# Patient Record
Sex: FEMALE | Race: BLACK OR AFRICAN AMERICAN | NOT HISPANIC OR LATINO
[De-identification: names, ages, dates, MRNs, and addresses within clinical notes are randomized per-mention and may not be internally consistent; named-entity substitution may affect disease eponyms.]

---

## 2021-08-12 ENCOUNTER — DASHBOARD ENCOUNTERS (OUTPATIENT)
Age: 26
End: 2021-08-12

## 2021-08-12 ENCOUNTER — OFFICE VISIT (OUTPATIENT)
Dept: URBAN - METROPOLITAN AREA CLINIC 2 | Facility: CLINIC | Age: 26
End: 2021-08-12
Payer: COMMERCIAL

## 2021-08-12 DIAGNOSIS — K59.04 CHRONIC IDIOPATHIC CONSTIPATION: ICD-10-CM

## 2021-08-12 DIAGNOSIS — Z3A.14 14 WEEKS GESTATION OF PREGNANCY: ICD-10-CM

## 2021-08-12 PROBLEM — 82934008: Status: ACTIVE | Noted: 2021-08-12

## 2021-08-12 PROCEDURE — 99204 OFFICE O/P NEW MOD 45 MIN: CPT | Performed by: INTERNAL MEDICINE

## 2021-08-12 NOTE — HPI-TODAY'S VISIT:
Very pleasant 25-year-old female that is about 13 to 14 weeks pregnant is seen today for complaints of constipation.  She reports long history of constipation that she has been able to manage with diet.  When she was younger her mother did take her to the ER several times for disimpaction.  She has seen OB/GYN and was told to increase fiber and water.  She is taking MiraLAX twice daily.  She reports no bowel movement in almost a month now.  About 2 weeks ago she did manually disimpact a small amount.  She has had colonoscopies x2 in the past

## 2021-08-12 NOTE — PHYSICAL EXAM GASTROINTESTINAL
Abdomen , soft, nontender, nondistended , no guarding or rigidity , no masses palpable , normal bowel sounds , gravid uterus, Liver and Spleen , no hepatomegaly present , no hepatosplenomegaly , liver nontender , spleen not palpable